# Patient Record
Sex: FEMALE | Race: WHITE | NOT HISPANIC OR LATINO | Employment: OTHER | URBAN - NONMETROPOLITAN AREA
[De-identification: names, ages, dates, MRNs, and addresses within clinical notes are randomized per-mention and may not be internally consistent; named-entity substitution may affect disease eponyms.]

---

## 2020-09-11 ENCOUNTER — OFFICE VISIT (OUTPATIENT)
Dept: OBSTETRICS AND GYNECOLOGY | Facility: CLINIC | Age: 60
End: 2020-09-11

## 2020-09-11 VITALS
BODY MASS INDEX: 33.31 KG/M2 | DIASTOLIC BLOOD PRESSURE: 70 MMHG | WEIGHT: 181 LBS | HEIGHT: 62 IN | SYSTOLIC BLOOD PRESSURE: 132 MMHG

## 2020-09-11 DIAGNOSIS — N76.0 ACUTE VAGINITIS: Primary | ICD-10-CM

## 2020-09-11 PROCEDURE — 99204 OFFICE O/P NEW MOD 45 MIN: CPT | Performed by: MIDWIFE

## 2020-09-11 RX ORDER — FLUTICASONE PROPIONATE 50 MCG
SPRAY, SUSPENSION (ML) NASAL
COMMUNITY
Start: 2020-09-03

## 2020-09-11 RX ORDER — ESTRADIOL 1 MG/1
TABLET ORAL
COMMUNITY
Start: 2020-08-07

## 2020-09-11 RX ORDER — GABAPENTIN 600 MG/1
TABLET ORAL
COMMUNITY
Start: 2020-09-03

## 2020-09-11 RX ORDER — CLOBETASOL PROPIONATE 0.5 MG/G
1 CREAM TOPICAL 2 TIMES DAILY
Qty: 15 G | Refills: 1 | Status: SHIPPED | OUTPATIENT
Start: 2020-09-11

## 2020-09-11 RX ORDER — CLOBETASOL PROPIONATE 0.5 MG/G
1 CREAM TOPICAL 2 TIMES DAILY
Qty: 15 G | Refills: 1 | Status: SHIPPED | OUTPATIENT
Start: 2020-09-11 | End: 2020-09-11 | Stop reason: SDUPTHER

## 2020-09-11 RX ORDER — PANTOPRAZOLE SODIUM 40 MG/1
TABLET, DELAYED RELEASE ORAL
COMMUNITY
Start: 2020-09-03

## 2020-09-11 RX ORDER — LAMOTRIGINE 200 MG/1
TABLET ORAL
COMMUNITY
Start: 2020-08-07

## 2020-09-11 RX ORDER — LINACLOTIDE 145 UG/1
CAPSULE, GELATIN COATED ORAL
COMMUNITY
Start: 2020-09-03

## 2020-09-11 NOTE — PROGRESS NOTES
"Subjective   Chief Complaint   Patient presents with   • Vaginitis     Patient complains of recurrent vaginal infections, with strong vaginal odor. Patient advised has been seen at [facility]. and treated with several doses of Diflucan and Flagyl.      Luh Ndiaye is a 60 y.o. year old  presenting to be seen for vaginal odor and itching.  This has been intermittent since January. She has been treated with Diflucan several times and then went to the health dept about 6 weeks ago and was treated with Flagyl.  She states the itching is very intense, internal and external.  She rarely has sex but states that makes it worse.  She does take Estradiol daily since having a hysterectomy .        History  Past Medical History:   Diagnosis Date   • Anxiety    • Bipolar disorder (CMS/HCC)    • Depression    • Female infertility    • Migraine    • PID (pelvic inflammatory disease)    • Urinary tract infection    , Past Surgical History:   Procedure Laterality Date   • GALLBLADDER SURGERY     • OOPHORECTOMY     • TOTAL ABDOMINAL HYSTERECTOMY     , Family History   Problem Relation Age of Onset   • Hypertension Father    • Coronary artery disease Father    • Stroke Mother    , Social History     Tobacco Use   • Smoking status: Former Smoker   • Smokeless tobacco: Never Used   Substance Use Topics   • Alcohol use: Yes     Frequency: Never   • Drug use: Never   ,   (Not in a hospital admission) and Allergies:  Patient has no known allergies.    Social History    Tobacco Use      Smoking status: Former Smoker      Smokeless tobacco: Never Used      Review of Systems  Pertinent items are noted in HPI, all other systems reviewed and negative       Objective   /70   Ht 157.5 cm (62\")   Wt 82.1 kg (181 lb)   Breastfeeding No   BMI 33.11 kg/m²     Physical Exam:  General Appearance: alert, appears stated age and cooperative  Neck: suppple, trachea midline and no thyromegaly  Lungs: clear to " auscultation, respirations regular, respirations even and respirations unlabored  Heart: regular rhythm and normal rate and normal S1, S2  Abdomen: no masses, soft non-tender and no guarding  Pelvic: External genitalia:  whitened skin labia minora especially anteriorly and around Clitoris. Small skin fissures in labial and clitoral folds.  Vaginal:  normal pink mucosa without prolapse or lesions. discharge present -  thin white;  Extremities: moves extremities well, no edema, no cyanosis and no redness  Skin: no bleeding, bruising or rash, color normal and no lesions noted  Neurologic: Mental Status orientated to person, place, time and situation, Speech normal content and execusion    Lab Review   No data reviewed    Imaging   No data reviewed         Assessment /Plan    Luh was seen today for vaginitis.    Diagnoses and all orders for this visit:    Acute vaginitis  -     NuSwab VG+ - Swab, Vagina    Other orders  -     Discontinue: clobetasol prop emollient base (Temovate E) 0.05 % emollient cream; Apply 1 application topically to the appropriate area as directed 2 (Two) Times a Day.  -     clobetasol prop emollient base (Temovate E) 0.05 % emollient cream; Apply 1 application topically to the appropriate area as directed 2 (Two) Times a Day.      Advised Temovate 2x daily for 2 weeks and then 2 x weekly.  Discussed Lichen Sclerosis  Follow up 1 month for recheck of symptoms. Biopsy if needed           This note was electronically signed.  Keesha Frye CNM  9/11/2020

## 2020-09-11 NOTE — PATIENT INSTRUCTIONS
Lichen Sclerosus  Lichen sclerosus is a skin problem. It can happen on any part of the body. It happens most often in the anal or genital areas. It can cause itching and discomfort. Treatment can help to control symptoms. It can also help prevent scarring that may lead to other problems.  What are the causes?  The cause of this condition is not known. It is not passed from one person to another (not contagious).  What increases the risk?  This condition is more likely to develop in women. It most often occurs after menopause.  What are the signs or symptoms?  Symptoms of this condition include:  · Thin, wrinkled, white areas on the skin.  · Thickened white areas on the skin.  · Red and swollen patches (lesions) on the skin.  · Tears or cracks in the skin.  · Bruising.  · Blood blisters.  · Very bad itching.  · Pain, itching, or burning when peeing (urinating).  · Trouble pooping (constipation).  How is this diagnosed?  This condition may be diagnosed with a physical exam. A sample of your skin may also be removed to be looked at under a microscope (biopsy).  How is this treated?  This condition may be treated with:  · Creams or ointments (topical steroids) that are put on the skin in the affected areas. This is the most common treatment.  · Medicines that are taken by mouth.  · Surgery. This is only needed if the condition is very bad and is causing problems such as scarring.  Follow these instructions at home:  · Take or use over-the-counter and prescription medicines only as told by your doctor.  · Use creams or ointments as told by your doctor.  · Do not scratch the affected areas of skin.  · If you are a woman, keep the vagina as clean and dry as you can.  · Clean the affected area of skin gently with water. Avoid using rough towels or toilet paper.  · Keep all follow-up visits as told by your doctor. This is important.  Contact a doctor if:  · Your redness, swelling, or pain gets worse.  · You have fluid,  blood, or pus coming from the area.  · You have new red and swollen patches on your skin.  · You have a fever.  · You have pain during sex.  Summary  · Lichen sclerosus is a skin problem. It can cause itching and discomfort.  · This condition is usually treated with creams or ointments that are put on the skin in the affected areas.  · Use medicines only as told by your doctor.  · Do not scratch the affected areas of skin.  · Keep all follow-up visits as told by your doctor. This is important.  This information is not intended to replace advice given to you by your health care provider. Make sure you discuss any questions you have with your health care provider.  Document Released: 11/30/2009 Document Revised: 05/02/2019 Document Reviewed: 05/02/2019  Elsevier Patient Education © 2020 [employer]

## 2020-09-14 ENCOUNTER — TELEPHONE (OUTPATIENT)
Dept: OBSTETRICS AND GYNECOLOGY | Facility: CLINIC | Age: 60
End: 2020-09-14

## 2020-09-14 NOTE — TELEPHONE ENCOUNTER
Call Pharmacy and see if there is an alternative. How much did Rx cost? That is the medicine that she needs.

## 2020-09-14 NOTE — TELEPHONE ENCOUNTER
----- Message from Desiree Castro sent at 9/14/2020 10:25 AM EDT -----  PT CALLED AND STATES THAT HER INSURANCE WILL NOT COVER THE CLOBETASOL CREAM. SHE WANTS TO KNOW WHAT TO DO    CRA[facility]

## 2020-09-23 LAB
A VAGINAE DNA VAG QL NAA+PROBE: ABNORMAL SCORE
BVAB2 DNA VAG QL NAA+PROBE: ABNORMAL SCORE
C ALBICANS DNA VAG QL NAA+PROBE: NEGATIVE
C GLABRATA DNA VAG QL NAA+PROBE: NEGATIVE
C TRACH DNA VAG QL NAA+PROBE: POSITIVE
MEGA1 DNA VAG QL NAA+PROBE: ABNORMAL SCORE
N GONORRHOEA DNA VAG QL NAA+PROBE: NEGATIVE
T VAGINALIS DNA VAG QL NAA+PROBE: NEGATIVE

## 2020-09-25 RX ORDER — AZITHROMYCIN 500 MG/1
1000 TABLET, FILM COATED ORAL DAILY
Qty: 2 TABLET | Refills: 0 | Status: SHIPPED | OUTPATIENT
Start: 2020-09-25 | End: 2020-09-26

## 2020-10-29 ENCOUNTER — OFFICE VISIT (OUTPATIENT)
Dept: OBSTETRICS AND GYNECOLOGY | Facility: CLINIC | Age: 60
End: 2020-10-29

## 2020-10-29 VITALS
WEIGHT: 179 LBS | SYSTOLIC BLOOD PRESSURE: 140 MMHG | HEIGHT: 62 IN | DIASTOLIC BLOOD PRESSURE: 78 MMHG | BODY MASS INDEX: 32.94 KG/M2

## 2020-10-29 DIAGNOSIS — Z11.3 SCREENING FOR STD (SEXUALLY TRANSMITTED DISEASE): Primary | ICD-10-CM

## 2020-10-29 PROCEDURE — 99213 OFFICE O/P EST LOW 20 MIN: CPT | Performed by: MIDWIFE

## 2020-10-29 RX ORDER — CLOBETASOL PROPIONATE 0.5 MG/G
CREAM TOPICAL
COMMUNITY
Start: 2020-09-18

## 2020-10-30 NOTE — PROGRESS NOTES
Subjective  Chief Complaint   Patient presents with   • Follow-up     Patient states she still has an odor, itching is better with the cream that sh has been using.      Luh Ndiaye is a 60 y.o. year old  here for followup on vaginal irritation and OSMEL for chlamydia.  She has been using Temovate cream PRN itchiness.  She, her ex  and her partner  were treated for Chlamydia.  She states the itching and irritation have resolved today. She has noticed a mild odor for a few days.    History  Past Medical History:   Diagnosis Date   • Anxiety    • Bipolar disorder (CMS/HCC)    • Depression    • Female infertility    • Migraine    • PID (pelvic inflammatory disease)    • Urinary tract infection      Current Outpatient Medications on File Prior to Visit   Medication Sig Dispense Refill   • clobetasol prop emollient base (Temovate E) 0.05 % emollient cream Apply 1 application topically to the appropriate area as directed 2 (Two) Times a Day. 15 g 1   • estradiol (ESTRACE) 1 MG tablet      • fluticasone (FLONASE) 50 MCG/ACT nasal spray      • gabapentin (NEURONTIN) 600 MG tablet      • lamoTRIgine (LaMICtal) 200 MG tablet      • LINZESS 145 MCG capsule capsule      • pantoprazole (PROTONIX) 40 MG EC tablet      • clobetasol (TEMOVATE) 0.05 % cream        No current facility-administered medications on file prior to visit.      No Known Allergies  Past Surgical History:   Procedure Laterality Date   • GALLBLADDER SURGERY     • OOPHORECTOMY     • TOTAL ABDOMINAL HYSTERECTOMY       Family History   Problem Relation Age of Onset   • Hypertension Father    • Coronary artery disease Father    • Stroke Mother      Social History     Socioeconomic History   • Marital status:      Spouse name: Not on file   • Number of children: Not on file   • Years of education: Not on file   • Highest education level: Not on file   Tobacco Use   • Smoking status: Former Smoker   • Smokeless tobacco: Never Used   Substance  "and Sexual Activity   • Alcohol use: Yes     Frequency: Never   • Drug use: Never   • Sexual activity: Yes     Partners: Male     Birth control/protection: Surgical     Review of Systems  Pertinent items are noted in HPI, all other systems reviewed and negative    Objective  Vitals:    10/29/20 1343   BP: 140/78   Weight: 81.2 kg (179 lb)   Height: 157.5 cm (62\")     Physical Exam:  General Appearance: alert, appears stated age and cooperative  Lungs: respirations regular, respirations even and respirations unlabored  Pelvic: External genitalia:  normal appearance of the external genitalia including Bartholin's and [place]'s glands. No redenss or fissures seen  Vaginal:  normal pink mucosa without prolapse or lesions.  Extremities: moves extremities well, no edema, no cyanosis and no redness  Skin: no bleeding, bruising or rash and no lesions noted  Neurologic: Mental Status orientated to person, place, time and situation, Speech normal content and execusion    Lab Review   No data reviewed    Imaging   No data reviewed    Assessment/Plan    Problem List Items Addressed This Visit     None      Visit Diagnoses     Screening for STD (sexually transmitted disease)    -  Primary    Relevant Orders    NuSwab VG+ - Swab, Vagina        Advised to use Temovate sparingly only during episodes of flareups  She will be notified of results    Follow up PRN     This note was electronically signed.  Keesha Frye, APRN  October 30, 2020  "

## 2020-11-01 LAB
A VAGINAE DNA VAG QL NAA+PROBE: ABNORMAL SCORE
BVAB2 DNA VAG QL NAA+PROBE: ABNORMAL SCORE
C ALBICANS DNA VAG QL NAA+PROBE: POSITIVE
C GLABRATA DNA VAG QL NAA+PROBE: NEGATIVE
C TRACH DNA VAG QL NAA+PROBE: NEGATIVE
MEGA1 DNA VAG QL NAA+PROBE: ABNORMAL SCORE
N GONORRHOEA DNA VAG QL NAA+PROBE: NEGATIVE
T VAGINALIS DNA VAG QL NAA+PROBE: NEGATIVE

## 2020-11-02 RX ORDER — FLUCONAZOLE 150 MG/1
150 TABLET ORAL EVERY OTHER DAY
Qty: 2 TABLET | Refills: 0 | Status: SHIPPED | OUTPATIENT
Start: 2020-11-02 | End: 2020-11-05

## 2020-11-04 NOTE — PROGRESS NOTES
Patient: Luh Ndiaye    YOB: 1960    Date: 11/09/2020    Primary Care Provider: Amada Hernandez DO    Chief Complaint   Patient presents with   • Colonoscopy     evaluation secondary to recent positive Cologuard.       SUBJECTIVE:    History of present illness: Patient states that for at least 6 months she has had intermittent bleeding and that one episode had a large amount of bright red blood per rectum.  She continues to feel intermittent rectal fullness even after evacuating.  She has intermittent constipation as well.  Nothing specifically otherwise makes the symptoms worse other than constipation.  She also had a recent Cologuard test that was positive.  Has never had a colonoscopy in the past.  Has some associated lower abdominal cramping.    The following portions of the patient's history were reviewed and updated as appropriate: allergies, current medications, past family history, past medical history, past social history, past surgical history and problem list.     Review of Systems   Constitutional: Negative for chills, fever and unexpected weight change.   HENT: Negative for hearing loss, trouble swallowing and voice change.    Eyes: Negative for visual disturbance.   Respiratory: Negative for apnea, cough, chest tightness, shortness of breath and wheezing.    Cardiovascular: Negative for chest pain, palpitations and leg swelling.   Gastrointestinal: Negative for abdominal distention, abdominal pain, anal bleeding, blood in stool, constipation, diarrhea, nausea, rectal pain and vomiting.   Endocrine: Negative for cold intolerance and heat intolerance.   Genitourinary: Negative for difficulty urinating, dysuria and flank pain.   Musculoskeletal: Negative for back pain and gait problem.   Skin: Negative for color change, rash and wound.   Neurological: Negative for dizziness, syncope, speech difficulty, weakness, light-headedness, numbness and headaches.   Hematological: Negative for  "adenopathy. Does not bruise/bleed easily.   Psychiatric/Behavioral: Negative for confusion. The patient is not nervous/anxious.        History:  Past Medical History:   Diagnosis Date   • Anxiety    • Bipolar disorder (CMS/HCC)    • Depression    • Female infertility    • Migraine    • PID (pelvic inflammatory disease)    • Urinary tract infection        Past Surgical History:   Procedure Laterality Date   • GALLBLADDER SURGERY     • OOPHORECTOMY     • TOTAL ABDOMINAL HYSTERECTOMY  2005       Family History   Problem Relation Age of Onset   • Hypertension Father    • Coronary artery disease Father    • Stroke Mother        Social History     Tobacco Use   • Smoking status: Former Smoker   • Smokeless tobacco: Never Used   Substance Use Topics   • Alcohol use: Yes     Frequency: Never   • Drug use: Never       Medications:   Current Outpatient Medications:   •  clobetasol (TEMOVATE) 0.05 % cream, , Disp: , Rfl:   •  clobetasol prop emollient base (Temovate E) 0.05 % emollient cream, Apply 1 application topically to the appropriate area as directed 2 (Two) Times a Day., Disp: 15 g, Rfl: 1  •  estradiol (ESTRACE) 1 MG tablet, , Disp: , Rfl:   •  fluticasone (FLONASE) 50 MCG/ACT nasal spray, , Disp: , Rfl:   •  gabapentin (NEURONTIN) 600 MG tablet, , Disp: , Rfl:   •  lamoTRIgine (LaMICtal) 200 MG tablet, , Disp: , Rfl:   •  LINZESS 145 MCG capsule capsule, , Disp: , Rfl:   •  pantoprazole (PROTONIX) 40 MG EC tablet, , Disp: , Rfl:        Allergies: No Known Allergies    OBJECTIVE:    Vital Signs:  Vitals:    11/09/20 1432   BP: 138/86   Pulse: 88   Resp: 18   Temp: 98.7 °F (37.1 °C)   TempSrc: Temporal   Weight: 81.6 kg (180 lb)   Height: 157.5 cm (62\")       Physical Exam:  General Appearance:    Alert, cooperative, in no acute distress   Head:    Normocephalic, without obvious abnormality, atraumatic   Eyes:            Normal.  No scleral icterus.  PERRLA    Lungs:     Clear to auscultation,respirations regular, even " and                  unlabored    Heart:    Regular rhythm and normal rate, normal S1 and S2, no            murmur   Abdomen:     Normal bowel sounds, no masses, no organomegaly, soft        non-tender, non-distended, no guarding,    Extremities:   Moves all extremities well, no edema, no cyanosis, no             redness   Skin:   No bleeding, bruising or rash   Neurologic:   Normal without gross deficits.   Psychiatric: No evidence of depression or anxiety         Results Review:   None    Review of Systems was reviewed and confirmed as accurate as documented by the MA.    ASSESSMENT/PLAN:    1. Positive colorectal cancer screening using Cologuard test    2. Rectal bleed      Patient with a positive Cologuard and rectal bleeding, rectal fullness after evacuating and lower abdominal cramping and has not had a colonoscopy in the past.  I recommend a colonoscopy to further evaluate.  I explained the procedure to the patient as well as the risks of bleeding and perforation and they understand the ramifications of these potential complications including operative intervention and they wish to proceed.    Electronically signed by Chucky Alejandro MD  11/09/20  10:31 EST

## 2020-11-09 ENCOUNTER — OFFICE VISIT (OUTPATIENT)
Dept: SURGERY | Facility: CLINIC | Age: 60
End: 2020-11-09

## 2020-11-09 VITALS
WEIGHT: 180 LBS | TEMPERATURE: 98.7 F | RESPIRATION RATE: 18 BRPM | BODY MASS INDEX: 33.13 KG/M2 | DIASTOLIC BLOOD PRESSURE: 86 MMHG | SYSTOLIC BLOOD PRESSURE: 138 MMHG | HEIGHT: 62 IN | HEART RATE: 88 BPM

## 2020-11-09 DIAGNOSIS — K62.5 RECTAL BLEED: ICD-10-CM

## 2020-11-09 DIAGNOSIS — R19.5 POSITIVE COLORECTAL CANCER SCREENING USING COLOGUARD TEST: Primary | ICD-10-CM

## 2020-11-09 PROCEDURE — 99204 OFFICE O/P NEW MOD 45 MIN: CPT | Performed by: SURGERY

## 2020-11-10 RX ORDER — BISACODYL 5 MG
TABLET, DELAYED RELEASE (ENTERIC COATED) ORAL
Qty: 4 TABLET | Refills: 0 | Status: SHIPPED | OUTPATIENT
Start: 2020-11-10

## 2020-11-10 RX ORDER — POLYETHYLENE GLYCOL 3350 17 G/17G
POWDER, FOR SOLUTION ORAL
Qty: 238 G | Refills: 0 | Status: SHIPPED | OUTPATIENT
Start: 2020-11-10

## 2020-11-16 ENCOUNTER — OUTSIDE FACILITY SERVICE (OUTPATIENT)
Dept: SURGERY | Facility: CLINIC | Age: 60
End: 2020-11-16

## 2020-11-16 PROCEDURE — 45380 COLONOSCOPY AND BIOPSY: CPT | Performed by: SURGERY

## 2020-11-24 NOTE — PROGRESS NOTES
Patient: Luh Ndiaye    YOB: 1960    Date: 11/30/2020    Primary Care Provider: Amada Hernandez DO    Reason for Consultation: Follow-up colonoscopy    Chief Complaint   Patient presents with   • Follow-up     colon       History of present illness:  I patient states that she is done well since the colonoscopy.  She has had no further bleeding.  Continues to have intermittent constipation.  She takes Linzess for treatment of that.  She has not tried fiber supplementation in the past.    The following portions of the patient's history were reviewed and updated as appropriate: allergies, current medications, past family history, past medical history, past social history, past surgical history and problem list.    Review of Systems   Constitutional: Negative for chills, fever and unexpected weight change.   HENT: Negative for hearing loss, trouble swallowing and voice change.    Eyes: Negative for visual disturbance.   Respiratory: Negative for apnea, cough, chest tightness, shortness of breath and wheezing.    Cardiovascular: Negative for chest pain, palpitations and leg swelling.   Gastrointestinal: Negative for abdominal distention, abdominal pain, anal bleeding, blood in stool, constipation, diarrhea, nausea, rectal pain and vomiting.   Endocrine: Negative for cold intolerance and heat intolerance.   Genitourinary: Negative for difficulty urinating, dysuria and flank pain.   Musculoskeletal: Negative for back pain and gait problem.   Skin: Negative for color change, rash and wound.   Neurological: Negative for dizziness, syncope, speech difficulty, weakness, light-headedness, numbness and headaches.   Hematological: Negative for adenopathy. Does not bruise/bleed easily.   Psychiatric/Behavioral: Negative for confusion. The patient is not nervous/anxious.        Allergies:  No Known Allergies    Medications:    Current Outpatient Medications:   •  bisacodyl (bisacodyl) 5 MG EC tablet, Take 2 at 3pm  "and 2 at 7pm the day prior to colonoscopy., Disp: 4 tablet, Rfl: 0  •  clobetasol (TEMOVATE) 0.05 % cream, , Disp: , Rfl:   •  clobetasol prop emollient base (Temovate E) 0.05 % emollient cream, Apply 1 application topically to the appropriate area as directed 2 (Two) Times a Day., Disp: 15 g, Rfl: 1  •  estradiol (ESTRACE) 1 MG tablet, , Disp: , Rfl:   •  fluticasone (FLONASE) 50 MCG/ACT nasal spray, , Disp: , Rfl:   •  gabapentin (NEURONTIN) 600 MG tablet, , Disp: , Rfl:   •  lamoTRIgine (LaMICtal) 200 MG tablet, , Disp: , Rfl:   •  LINZESS 145 MCG capsule capsule, , Disp: , Rfl:   •  pantoprazole (PROTONIX) 40 MG EC tablet, , Disp: , Rfl:   •  polyethylene glycol (MIRALAX) 17 GM/SCOOP powder, Mix 238g powder with 64 oz of clear liquid. Starting at 5pm drink 80z every 10-15 minutes until consumed., Disp: 238 g, Rfl: 0    Vital Signs:  Vitals:    11/30/20 1429   BP: (!) 151/101   Pulse: 92   Temp: 98.2 °F (36.8 °C)   TempSrc: Temporal   SpO2: 98%   Weight: 81.6 kg (179 lb 14.3 oz)   Height: 157.5 cm (62.01\")       Physical Exam:   General Appearance:    Alert, cooperative, in no acute distress   Abdomen:    Nontender nondistended            Cor:     Rate and rhythm   Results Review:   I reviewed the patient's new clinical results.  Pathology was reviewed    Assessment / Plan:    1. Rectal bleed    2. Hx of adenomatous colonic polyps        Patient is bleeding most likely hemorrhoidal in nature although they were relatively small on evaluation during colonoscopy.  She has associated constipation for which I recommend a fiber supplement of choice daily such as Benefiber.  If she has further issues she will follow-up with me.  She will require repeat colonoscopy in 3 years since she had 4 adenomatous colon polyps.    Electronically signed by Chucky Alejandro MD  11/30/20    Portions of this note have been scribed for Chucky Alejandro MD by Michelle Milligan. 11/30/2020  15:02 EST                    "

## 2020-11-30 ENCOUNTER — OFFICE VISIT (OUTPATIENT)
Dept: SURGERY | Facility: CLINIC | Age: 60
End: 2020-11-30

## 2020-11-30 VITALS
TEMPERATURE: 98.2 F | SYSTOLIC BLOOD PRESSURE: 151 MMHG | WEIGHT: 179.9 LBS | OXYGEN SATURATION: 98 % | BODY MASS INDEX: 33.1 KG/M2 | DIASTOLIC BLOOD PRESSURE: 101 MMHG | HEIGHT: 62 IN | HEART RATE: 92 BPM

## 2020-11-30 DIAGNOSIS — K62.5 RECTAL BLEED: Primary | ICD-10-CM

## 2020-11-30 DIAGNOSIS — Z86.010 HX OF ADENOMATOUS COLONIC POLYPS: ICD-10-CM

## 2020-11-30 PROCEDURE — 99213 OFFICE O/P EST LOW 20 MIN: CPT | Performed by: SURGERY

## 2021-01-11 ENCOUNTER — OFFICE VISIT (OUTPATIENT)
Dept: SURGERY | Facility: CLINIC | Age: 61
End: 2021-01-11

## 2021-01-11 VITALS
BODY MASS INDEX: 32.89 KG/M2 | SYSTOLIC BLOOD PRESSURE: 112 MMHG | TEMPERATURE: 98.2 F | OXYGEN SATURATION: 99 % | HEART RATE: 97 BPM | HEIGHT: 62 IN | DIASTOLIC BLOOD PRESSURE: 78 MMHG

## 2021-01-11 DIAGNOSIS — K59.00 CONSTIPATION, UNSPECIFIED CONSTIPATION TYPE: Primary | ICD-10-CM

## 2021-01-11 DIAGNOSIS — K59.04 CHRONIC IDIOPATHIC CONSTIPATION: ICD-10-CM

## 2021-01-11 DIAGNOSIS — R10.30 LOWER ABDOMINAL PAIN: Primary | ICD-10-CM

## 2021-01-11 PROCEDURE — 99213 OFFICE O/P EST LOW 20 MIN: CPT | Performed by: SURGERY

## 2021-01-11 NOTE — PROGRESS NOTES
Patient: Luh Ndiaye    YOB: 1960    Date: 01/11/2021    Primary Care Provider: Amada Hernandez DO    Reason for Consultation: Follow-up colonoscopy    Chief Complaint   Patient presents with   • Follow-up     colonoscopy       History of present illness: States that last week she started taking Metamucil.  She began having severe lower abdominal crampy pain couple days later.  Now she is having diarrhea and gelatinous bowel movements.  Pain has resolved.  The following portions of the patient's history were reviewed and updated as appropriate: allergies, current medications, past family history, past medical history, past social history, past surgical history and problem list.    Review of Systems   Constitutional: Negative for chills, fever and unexpected weight change.   HENT: Negative for hearing loss, trouble swallowing and voice change.    Eyes: Negative for visual disturbance.   Respiratory: Negative for apnea, cough, chest tightness, shortness of breath and wheezing.    Cardiovascular: Negative for chest pain, palpitations and leg swelling.   Gastrointestinal: Positive for abdominal pain and diarrhea. Negative for abdominal distention, anal bleeding, blood in stool, constipation, nausea, rectal pain and vomiting.   Endocrine: Negative for cold intolerance and heat intolerance.   Genitourinary: Negative for difficulty urinating, dysuria and flank pain.   Musculoskeletal: Negative for back pain and gait problem.   Skin: Negative for color change, rash and wound.   Neurological: Negative for dizziness, syncope, speech difficulty, weakness, light-headedness, numbness and headaches.   Hematological: Negative for adenopathy. Does not bruise/bleed easily.   Psychiatric/Behavioral: Negative for confusion. The patient is not nervous/anxious.        Allergies:  No Known Allergies    Medications:    Current Outpatient Medications:   •  bisacodyl (bisacodyl) 5 MG EC tablet, Take 2 at 3pm and 2 at 7pm  "the day prior to colonoscopy., Disp: 4 tablet, Rfl: 0  •  clobetasol (TEMOVATE) 0.05 % cream, , Disp: , Rfl:   •  clobetasol prop emollient base (Temovate E) 0.05 % emollient cream, Apply 1 application topically to the appropriate area as directed 2 (Two) Times a Day., Disp: 15 g, Rfl: 1  •  estradiol (ESTRACE) 1 MG tablet, , Disp: , Rfl:   •  fluticasone (FLONASE) 50 MCG/ACT nasal spray, , Disp: , Rfl:   •  gabapentin (NEURONTIN) 600 MG tablet, , Disp: , Rfl:   •  lamoTRIgine (LaMICtal) 200 MG tablet, , Disp: , Rfl:   •  LINZESS 145 MCG capsule capsule, , Disp: , Rfl:   •  pantoprazole (PROTONIX) 40 MG EC tablet, , Disp: , Rfl:   •  polyethylene glycol (MIRALAX) 17 GM/SCOOP powder, Mix 238g powder with 64 oz of clear liquid. Starting at 5pm drink 80z every 10-15 minutes until consumed., Disp: 238 g, Rfl: 0    Vital Signs:  Vitals:    01/11/21 1406   BP: 112/78   Pulse: 97   Temp: 98.2 °F (36.8 °C)   TempSrc: Temporal   SpO2: 99%   Height: 157.5 cm (62.01\")       Physical Exam:   General Appearance:    Alert, cooperative, in no acute distress   Abdomen:    Soft nontender nondistended            Cor:     Regular rate and rhythm    Results Review:   None    Assessment / Plan:    1. Lower abdominal pain    2. Chronic idiopathic constipation      Believe her abdominal pain is consistent with likely constipation issue.  I will order an x-ray to confirm.  In the past Linzess has helped her.  I asked her to give this a trial.  A fleets might be helpful as well in advance.  She is agreeable.  Long-term ideally a fiber supplement daily would be helpful if tolerated.    Electronically signed by Chucky Alejandro MD  01/11/21    Portions of this note have been scribed for Chucky Alejandro MD by Michelle Milligan. 1/11/2021  14:38 EST                  "

## 2023-10-11 ENCOUNTER — TELEPHONE (OUTPATIENT)
Dept: SURGERY | Facility: CLINIC | Age: 63
End: 2023-10-11
Payer: MEDICARE

## 2023-10-11 NOTE — TELEPHONE ENCOUNTER
FUENTES Arvizu to call and update insurance information. Pt on 3 yr colonoscopy recall Mailing out letter.